# Patient Record
Sex: MALE | Race: WHITE | NOT HISPANIC OR LATINO
[De-identification: names, ages, dates, MRNs, and addresses within clinical notes are randomized per-mention and may not be internally consistent; named-entity substitution may affect disease eponyms.]

---

## 2024-05-15 PROBLEM — Z00.00 ENCOUNTER FOR PREVENTIVE HEALTH EXAMINATION: Status: ACTIVE | Noted: 2024-05-15

## 2024-05-22 ENCOUNTER — APPOINTMENT (OUTPATIENT)
Dept: ORTHOPEDIC SURGERY | Facility: CLINIC | Age: 39
End: 2024-05-22
Payer: COMMERCIAL

## 2024-05-22 VITALS — HEIGHT: 74 IN | WEIGHT: 245 LBS | BODY MASS INDEX: 31.44 KG/M2

## 2024-05-22 DIAGNOSIS — Z85.6 PERSONAL HISTORY OF LEUKEMIA: ICD-10-CM

## 2024-05-22 DIAGNOSIS — Z78.9 OTHER SPECIFIED HEALTH STATUS: ICD-10-CM

## 2024-05-22 DIAGNOSIS — S59.909A UNSPECIFIED INJURY OF UNSPECIFIED ELBOW, INITIAL ENCOUNTER: ICD-10-CM

## 2024-05-22 PROCEDURE — 99204 OFFICE O/P NEW MOD 45 MIN: CPT

## 2024-05-22 RX ORDER — MELOXICAM 7.5 MG/1
7.5 TABLET ORAL
Qty: 30 | Refills: 0 | Status: ACTIVE | COMMUNITY
Start: 2024-05-22 | End: 1900-01-01

## 2024-05-22 NOTE — IMAGING
[de-identified] : RIGHT ELBOW EXAM +ttp at lateral epicondyle and triceps insertion; full elbow extension strength Skin intact No deformity, edema, or ecchymosis Hand with brisk capillary refill, warm and well perfused Motor function intact to AIN, PIN, ulnar nerves Sensation intact median, ulnar, radial nerves  Elbow ROM   Flexion 135   Extension to 0   Supination 85   Pronation 85  No elbow instability noted Strength for elbow flexion & extension is 5/5 Hand and wrist motion is intact and full Patient able to make a composite fist  Right elbow radiographs with no fracture nor dislocation. Triceps enthesophyte. Radiocapittellar and ulnohumeral joints aligned. (3-view)

## 2024-05-22 NOTE — HISTORY OF PRESENT ILLNESS
[5] : 5 [de-identified] : Age: 39 year M PMHx: Leukemia Hand Dominance: RHD Chief Complaint: Right elbow pain onset 04/15/24 s/p fall. Patient reports that he had a mechanical fall where he had fallen and hit several things before landing on his right elbow. Patient had radiographs performed at  early May 2024 that showed enthesophyte on the right elbow. Denies numbness/tingling.  Trauma: Mechanical fall early April 2024 Outside Imaging/Treatment: radiographs from  05/07/24 OTC Medications: none OT/PT: none Bracing: none Pain worse with: exertion, weight bearing Pain better with: rest

## 2024-05-22 NOTE — ASSESSMENT
[FreeTextEntry1] : Right lateral epicondylitis and triceps insertional tendonitis - reviewed radiographs and overall pathoanatomy with patient. Discussed management to consist of NSAIDs prn, PT, johnny into use.  Script for Meloxicam sent to pharmacy.  F/u 3mo/prn